# Patient Record
Sex: MALE | Race: WHITE
[De-identification: names, ages, dates, MRNs, and addresses within clinical notes are randomized per-mention and may not be internally consistent; named-entity substitution may affect disease eponyms.]

---

## 2019-11-28 ENCOUNTER — HOSPITAL ENCOUNTER (EMERGENCY)
Dept: HOSPITAL 63 - ER | Age: 64
LOS: 1 days | Discharge: HOME | End: 2019-11-29
Payer: OTHER GOVERNMENT

## 2019-11-28 VITALS — DIASTOLIC BLOOD PRESSURE: 83 MMHG | SYSTOLIC BLOOD PRESSURE: 177 MMHG

## 2019-11-28 VITALS — HEIGHT: 71 IN | WEIGHT: 258 LBS | BODY MASS INDEX: 36.12 KG/M2

## 2019-11-28 DIAGNOSIS — I10: ICD-10-CM

## 2019-11-28 DIAGNOSIS — F17.210: ICD-10-CM

## 2019-11-28 DIAGNOSIS — H66.93: ICD-10-CM

## 2019-11-28 DIAGNOSIS — J06.9: Primary | ICD-10-CM

## 2019-11-28 DIAGNOSIS — E11.9: ICD-10-CM

## 2019-11-28 LAB
INFLUENZA A PATIENT: NEGATIVE
INFLUENZA B PATIENT: NEGATIVE

## 2019-11-28 PROCEDURE — 87804 INFLUENZA ASSAY W/OPTIC: CPT

## 2019-11-28 PROCEDURE — 87070 CULTURE OTHR SPECIMN AEROBIC: CPT

## 2019-11-28 PROCEDURE — 99284 EMERGENCY DEPT VISIT MOD MDM: CPT

## 2019-11-28 PROCEDURE — 87880 STREP A ASSAY W/OPTIC: CPT

## 2019-11-28 NOTE — PHYS DOC
Past History


Past Medical History:  Diabetes, Hypertension


Past Surgical History:  No Surgical History


Smoking:  Cigar


Alcohol Use:  Occasionally


Drug Use:  None





Adult General


Chief Complaint


Chief Complaint:  SORE THROAT..." I am all stuffed  up,  congestion, sore 

throat... my face and eyes are swollen,  fever, chills,  aching.. my ears hurt..

I just miserable.. My sugar are doing okay... it was 112 at home when I 

checked.. them.. "





HPI


HPI





Patient is a 64 year old male who presents with above hx and complaints 

respiratory infection, fever, chills, malaise, pharyngitis, otitis, and history 

of diabetes.    No recent travel or specific ill contacts. Normally healthy with

the exception of his diabetes.  Follows with Dr. Tom.





Review of Systems


Review of Systems





Constitutional: History of fever or chills []


Eyes: Denies change in visual acuity, redness, or eye pain []


HENT: History of nasal congestion and bilateral ear pain and congestion and sore

 throat []


Respiratory: History of cough 


Cardiovascular: No additional information not addressed in HPI []


GI: Denies abdominal pain, nausea, vomiting, bloody stools or diarrhea []


: Denies dysuria or hematuria []


Musculoskeletal: Denies back pain or joint pain []


Integument: Denies rash or skin lesions []


Neurologic: Denies headache, focal weakness or sensory changes []


Endocrine: Denies polyuria or polydipsia []





All other systems were reviewed and found to be within normal limits, except as 

documented in this note.





Family History


Family History


Diabetes





Current Medications


Current Medications


See nursing for home meds





Allergies


Allergies





Allergies








Coded Allergies Type Severity Reaction Last Updated Verified


 


  No Known Drug Allergies    8/17/14 No











Physical Exam


Physical Exam





Constitutional: Moderate acute distress, non-toxic appearance. []


HENT: Normocephalic, atraumatic, bilateral external ears normal, bilateral TMs 

are injected in fluid, oropharynx moist, ejected pharynx, no oral exudates, nose

 swollen turbinates and rhinorrhea


Eyes: PERRLA, EOMI, conjunctiva normal, no discharge. [] 


Neck: Normal range of motion, no tenderness, supple, no stridor. [] 


Cardiovascular:Heart rate regular rhythm, no murmur []


Lungs & Thorax:  Bilateral breath sounds with apex with few scattered wheezes on

 auscultation []


Abdomen: Bowel sounds normal, soft, no tenderness, no masses, no pulsatile 

masses. [] 


Skin: Warm, dry, no erythema, no rash. [] 


Back: No tenderness, no CVA tenderness. [] 


Extremities: No tenderness, no cyanosis, no clubbing, ROM intact, no edema. [] 


Neurologic: Alert and oriented X 3, normal motor function, normal sensory 

function, no focal deficits noted. []


Psychologic: Affect anxious, judgement normal, mood normal. []





Current Patient Data


Vital Signs





                                   Vital Signs








  Date Time  Temp Pulse Resp B/P (MAP) Pulse Ox O2 Delivery O2 Flow Rate FiO2


 


11/28/19 21:18 98.0 93 18  97 Room Air  











EKG


EKG


[]





Radiology/Procedures


Radiology/Procedures


[]





Course & Med Decision Making


Course & Med Decision Making


Pertinent Labs and Imaging studies reviewed. (See chart for details)








Take Tylenol and ibuprofen as needed for fever and discomfort. Benadryl 50 mg at

 night may be helpful for congestion and drainage. Patient use 2 sprays at from 

both nose at night when she goes to bed. Patient use Flonase 2 sprays at night 

when he goes to bed. Patient use normal saline rinses or sprays to nose 4 times 

a day and as needed. Patient take Keflex 500 mg 3 times a day. Patient follow-up

 with Dr. Mejia. Patient return if any concerns. Patient to monitor sugars 

because of prednisone may elevate his next 2 glucose levels. Patient uses MDI 2 

puffs 4 times a day.





[]





Impression:





1. Upper respiratory infection


2. Otitis


3. History of diabetes





Dragon Disclaimer


Dragon Disclaimer


This electronic medical record was generated, in whole or in part, using a voice

 recognition dictation system.





Departure


Departure:


Disposition:  01 HOME/RESIDENCE PRIOR TO ADM


Condition:  STABLE


Referrals:  


EMETERIO TOM MD (PCP)


Scripts


Cephalexin (KEFLEX) 500 Mg Capsule


500 MG PO TID for URI for 10 Days, BOTTLE


   Prov: FACUNDO NUNO MD         11/28/19





Dragon Disclaimer


This chart was dictated in whole or in part using Voice Recognition software in 

a busy, high-work load, and often noisy Emergency Department environment.  It 

may contain unintended and wholly unrecognized errors or omissions.











FACUNDO NUNO MD           Nov 28, 2019 22:01